# Patient Record
(demographics unavailable — no encounter records)

---

## 2017-05-18 NOTE — RAD
PORTABLE UPRIGHT FRONTAL CHEST RADIOGRAPH

5/18/17

 

COMPARISON:  

6/8/14

 

HISTORY: 

Chest pain.

 

FINDINGS:  

Heart and mediastinal contours are stable. No pneumothorax, pleural fluid, focal consolidation, or a
lveolar edema. There is mild prominence of the cardiac silhouette, likely on the basis of shallow in
spiration and AP portable technique. 

 

IMPRESSION:  

No focal consolidation or alveolar edema. 

 

POS: H

## 2021-01-22 NOTE — RAD
ABDOMEN 1 VIEW:

 

Date:  01/22/2021

 

HISTORY:  

Acute epigastric pain. 

 

COMPARISON:  

None. 

 

FINDINGS:

No dilated loops of large or small bowel. Moderate stool burden within the ascending colon. 

 

No abnormal calcifications project over the renal shadows. No acute osseous abnormality. 

 

IMPRESSION: 

Unremarkable abdominal exam. 

 

 

POS: AH

## 2021-01-29 NOTE — RAD
EXAM: 

CHEST ONE VIEW



HISTORY:

Cough



COMPARISON:

None.



FINDINGS:

The cardiac silhouette and pulmonary vasculature are within normal limits. The lungs are clear. [place]
us structures have normal appearance.



IMPRESSION:

No acute cardiopulmonary process.



Reported By: Dalton Al 

Electronically Signed:  1/29/2021 2:46 PM